# Patient Record
Sex: FEMALE | URBAN - METROPOLITAN AREA
[De-identification: names, ages, dates, MRNs, and addresses within clinical notes are randomized per-mention and may not be internally consistent; named-entity substitution may affect disease eponyms.]

---

## 2019-10-03 ENCOUNTER — APPOINTMENT (RX ONLY)
Dept: URBAN - METROPOLITAN AREA CLINIC 170 | Facility: CLINIC | Age: 84
Setting detail: DERMATOLOGY
End: 2019-10-03

## 2019-10-03 DIAGNOSIS — D18.0 HEMANGIOMA: ICD-10-CM

## 2019-10-03 DIAGNOSIS — L82.1 OTHER SEBORRHEIC KERATOSIS: ICD-10-CM

## 2019-10-03 DIAGNOSIS — D22 MELANOCYTIC NEVI: ICD-10-CM

## 2019-10-03 DIAGNOSIS — L81.4 OTHER MELANIN HYPERPIGMENTATION: ICD-10-CM

## 2019-10-03 DIAGNOSIS — L57.0 ACTINIC KERATOSIS: ICD-10-CM

## 2019-10-03 PROBLEM — I10 ESSENTIAL (PRIMARY) HYPERTENSION: Status: ACTIVE | Noted: 2019-10-03

## 2019-10-03 PROBLEM — D22.5 MELANOCYTIC NEVI OF TRUNK: Status: ACTIVE | Noted: 2019-10-03

## 2019-10-03 PROBLEM — D18.01 HEMANGIOMA OF SKIN AND SUBCUTANEOUS TISSUE: Status: ACTIVE | Noted: 2019-10-03

## 2019-10-03 PROCEDURE — 17003 DESTRUCT PREMALG LES 2-14: CPT

## 2019-10-03 PROCEDURE — ? FULL BODY SKIN EXAM

## 2019-10-03 PROCEDURE — ? COUNSELING

## 2019-10-03 PROCEDURE — ? LIQUID NITROGEN

## 2019-10-03 PROCEDURE — 17000 DESTRUCT PREMALG LESION: CPT

## 2019-10-03 PROCEDURE — 99213 OFFICE O/P EST LOW 20 MIN: CPT | Mod: 25

## 2019-10-03 ASSESSMENT — LOCATION DETAILED DESCRIPTION DERM
LOCATION DETAILED: NASAL DORSUM
LOCATION DETAILED: RIGHT LATERAL SUPERIOR CHEST
LOCATION DETAILED: MIDDLE STERNUM
LOCATION DETAILED: LEFT MEDIAL BREAST 10-11:00 REGION
LOCATION DETAILED: STERNAL NOTCH

## 2019-10-03 ASSESSMENT — LOCATION SIMPLE DESCRIPTION DERM
LOCATION SIMPLE: LEFT BREAST
LOCATION SIMPLE: NOSE
LOCATION SIMPLE: CHEST

## 2019-10-03 ASSESSMENT — LOCATION ZONE DERM
LOCATION ZONE: NOSE
LOCATION ZONE: TRUNK

## 2019-10-03 NOTE — PROCEDURE: MIPS QUALITY
Quality 474: Zoster Vaccination Status: Shingrix Vaccination Administered or Previously Received
Quality 431: Preventive Care And Screening: Unhealthy Alcohol Use - Screening: Patient screened for unhealthy alcohol use using a single question and scores less than 2 times per year
Quality 110: Preventive Care And Screening: Influenza Immunization: Influenza Immunization Administered during Influenza season
Quality 226: Preventive Care And Screening: Tobacco Use: Screening And Cessation Intervention: Patient screened for tobacco use and is an ex/non-smoker
Quality 111:Pneumonia Vaccination Status For Older Adults: Pneumococcal Vaccination Previously Received
Quality 130: Documentation Of Current Medications In The Medical Record: Current Medications Documented
Detail Level: Detailed

## 2019-10-03 NOTE — HPI: EVALUATION OF SKIN LESION(S)
What Type Of Note Output Would You Prefer (Optional)?: Bullet Format
Hpi Title: Evaluation of Skin Lesions
How Severe Are Your Spot(S)?: mild
Have Your Spot(S) Been Treated In The Past?: has not been treated
Family Member: Son
Additional History: Spot on nose pink and ooze and has bleed a little bit x 6 months. It is a little shiny. Used polysporin every night x 6 months.

## 2020-08-21 ENCOUNTER — APPOINTMENT (OUTPATIENT)
Dept: CT IMAGING | Age: 85
End: 2020-08-21
Payer: MEDICARE

## 2020-08-21 ENCOUNTER — APPOINTMENT (OUTPATIENT)
Dept: GENERAL RADIOLOGY | Age: 85
End: 2020-08-21
Payer: MEDICARE

## 2020-08-21 ENCOUNTER — HOSPITAL ENCOUNTER (EMERGENCY)
Age: 85
Discharge: HOME | End: 2020-08-22
Attending: EMERGENCY MEDICINE
Payer: MEDICARE

## 2020-08-21 VITALS
BODY MASS INDEX: 18.65 KG/M2 | HEART RATE: 85 BPM | DIASTOLIC BLOOD PRESSURE: 64 MMHG | SYSTOLIC BLOOD PRESSURE: 152 MMHG | HEIGHT: 60 IN | TEMPERATURE: 97.5 F | OXYGEN SATURATION: 98 % | RESPIRATION RATE: 14 BRPM | WEIGHT: 95 LBS

## 2020-08-21 PROCEDURE — 72072 X-RAY EXAM THORAC SPINE 3VWS: CPT

## 2020-08-21 PROCEDURE — 70450 CT HEAD/BRAIN W/O DYE: CPT

## 2020-08-21 PROCEDURE — 71046 X-RAY EXAM CHEST 2 VIEWS: CPT

## 2020-08-21 PROCEDURE — 99284 EMERGENCY DEPT VISIT MOD MDM: CPT

## 2020-08-21 RX ORDER — LIDOCAINE 4 G/G
1 PATCH TOPICAL ONCE
Status: DISCONTINUED | OUTPATIENT
Start: 2020-08-22 | End: 2020-08-22 | Stop reason: HOSPADM

## 2020-08-21 ASSESSMENT — ENCOUNTER SYMPTOMS
BACK PAIN: 1
ABDOMINAL PAIN: 0
NAUSEA: 0
VOMITING: 0
SHORTNESS OF BREATH: 0

## 2020-08-21 ASSESSMENT — PAIN DESCRIPTION - PAIN TYPE: TYPE: ACUTE PAIN

## 2020-08-21 ASSESSMENT — PAIN DESCRIPTION - LOCATION: LOCATION: HEAD

## 2020-08-21 ASSESSMENT — PAIN SCALES - GENERAL: PAINLEVEL_OUTOF10: 5

## 2020-08-22 PROCEDURE — 6370000000 HC RX 637 (ALT 250 FOR IP): Performed by: PHYSICIAN ASSISTANT

## 2020-08-22 NOTE — ED PROVIDER NOTES
ED Attending Attestation Note     Date of evaluation: 8/21/2020    This patient was seen by the advance practice provider. I have seen and examined the patient, agree with the workup, evaluation, management and diagnosis. The care plan has been discussed. My assessment reveals an awake and alert woman who appears in no discomfort or distress. Normal spontaneous full range of motion of the neck. Normal strength in her extremities. Abdomen soft without rebound guarding distention or focal tenderness. She used the bathroom prior to coming to the emergency department, and did not notice hematuria.           Elton Johnson MD  08/21/20 0188

## 2020-08-22 NOTE — ED NOTES
Bed: A03-03  Expected date:   Expected time:   Means of arrival:   Comments:  Hold for Gemma Purvis RN  08/21/20 2126

## 2020-08-22 NOTE — ED PROVIDER NOTES
[address] [facility] [address] ENCOUNTER          PHYSICIAN ASSISTANT NOTE       Date of evaluation: 8/21/2020    Chief Complaint     Fall (Patient presents after a fall 30 mins ago at home. Patient sts she missed the last step and hit her head. Denies dizziness or lightheadness. Denies taking blood thinning medication. )      History of Present Illness     Freddy Bobby is a 80 y.o. female with PMH of Arthritis, HLD who presents from home after mechanical fall. Patient states she was walking down the stairs and missed the last step. She fell backwards, striking her head on the wall, and her right side on the step. She denies LOC. She is not on blood thinners or ASA. She pushed her life alert button after walling and was assisted up by EMS. She was ambulatory after the fall without difficulty. She complains of \"goose egg\" to the back of her head and soreness to her right posterior ribs. She denies any HA, vision changes, dizziness, focal numbness or weakness, neck pain, chest pain, shortness of breath, abdominal pain, extremity pain. She states that she urinated post fall without any hematuria. Review of Systems     Review of Systems   Constitutional: Negative for chills and fever. Eyes: Negative for visual disturbance. Respiratory: Negative for shortness of breath. Cardiovascular: Negative for chest pain. Gastrointestinal: Negative for abdominal pain, nausea and vomiting. Genitourinary: Negative for flank pain and hematuria. Musculoskeletal: Positive for back pain. Negative for neck pain. Skin: Negative for wound. Neurological: Negative for dizziness, speech difficulty, weakness, numbness and headaches. Psychiatric/Behavioral: The patient is not nervous/anxious.         Past Medical, Surgical, Family, and Social History     She has a past medical history of Abdominal bruit, Carotid stenosis, Celiac disease, Degenerative arthritis of right shoulder region, Diverticulosis of colon, Hyperlipidemia, Iron defic anemia, Osteoarthritis of left hip, Osteoarthritis of left knee, Osteopenia, Osteoporosis, Pelvic pain, Sciatica of left side, Vitamin D deficiency, and Weight loss. She has a past surgical history that includes Tonsillectomy; Hysterectomy; bladder repair; Bartholin gland cyst excision; and Cataract removal with implant (10/25/12). Her family history includes Anxiety Disorder in her mother; Cancer in her father; Depression in her mother; Diabetes in her mother. She reports that she has never smoked. She has never used smokeless tobacco. She reports current alcohol use. Medications     Current Discharge Medication List      CONTINUE these medications which have NOT CHANGED    Details   amLODIPine Besylate (NORVASC PO) Take by mouth      acetaminophen (TYLENOL) 500 MG tablet Take 1,000 mg by mouth 3 times daily      LUTEIN PO Take 1 tablet by mouth daily. Calcium Carbonate (CALTRATE 600 PO) Take 2 tablets by mouth daily. vitamin B-12 (CYANOCOBALAMIN) 100 MCG tablet 1,000 mcg daily. Cholecalciferol (VITAMIN D) 1000 UNIT TABS Take 1,000 Units by mouth daily. docusate sodium (COLACE) 100 MG capsule Take 100 mg by mouth 3 times daily as needed. Associated Diagnoses: Constipation      ASCORBIC ACID daily. ferrous sulfate 325 (65 FE) MG tablet Take 325 mg by mouth daily. Allergies     She has No Known Allergies. Physical Exam     INITIAL VITALS: BP: (!) 177/73,Temp: 97.5 °F (36.4 °C), Pulse: 85, Resp: 14, SpO2: 98 %   Physical Exam  Vitals signs and nursing note reviewed. Constitutional:       General: She is not in acute distress. HENT:      Head: Normocephalic. Contusion (hematoma to occiptal scalp) present. Mouth/Throat:      Mouth: Mucous membranes are moist.      Pharynx: Oropharynx is clear. Eyes:      Extraocular Movements: Extraocular movements intact.       Conjunctiva/sclera: Conjunctivae normal.      Pupils: Pupils are Emergency [address] [facility]  [phone]    If symptoms worsen      DISCHARGE MEDICATIONS:  Current Discharge Medication List           DISPOSITION  Discharge.        Leandra Meza PA-C  08/21/20 [address] [facility] [place]  08/21/20 8706

## 2020-10-08 ENCOUNTER — APPOINTMENT (RX ONLY)
Dept: URBAN - METROPOLITAN AREA CLINIC 170 | Facility: CLINIC | Age: 85
Setting detail: DERMATOLOGY
End: 2020-10-08

## 2020-10-08 DIAGNOSIS — D18.0 HEMANGIOMA: ICD-10-CM

## 2020-10-08 DIAGNOSIS — L82.1 OTHER SEBORRHEIC KERATOSIS: ICD-10-CM

## 2020-10-08 DIAGNOSIS — L81.4 OTHER MELANIN HYPERPIGMENTATION: ICD-10-CM

## 2020-10-08 DIAGNOSIS — D22 MELANOCYTIC NEVI: ICD-10-CM

## 2020-10-08 PROBLEM — D18.01 HEMANGIOMA OF SKIN AND SUBCUTANEOUS TISSUE: Status: ACTIVE | Noted: 2020-10-08

## 2020-10-08 PROBLEM — D22.5 MELANOCYTIC NEVI OF TRUNK: Status: ACTIVE | Noted: 2020-10-08

## 2020-10-08 PROCEDURE — ? ADDITIONAL NOTES

## 2020-10-08 PROCEDURE — ? FULL BODY SKIN EXAM

## 2020-10-08 PROCEDURE — ? COUNSELING

## 2020-10-08 PROCEDURE — 99213 OFFICE O/P EST LOW 20 MIN: CPT

## 2020-10-08 ASSESSMENT — LOCATION SIMPLE DESCRIPTION DERM
LOCATION SIMPLE: LEFT BREAST
LOCATION SIMPLE: CHEST

## 2020-10-08 ASSESSMENT — LOCATION DETAILED DESCRIPTION DERM
LOCATION DETAILED: RIGHT LATERAL SUPERIOR CHEST
LOCATION DETAILED: MIDDLE STERNUM
LOCATION DETAILED: LEFT MEDIAL BREAST 10-11:00 REGION
LOCATION DETAILED: STERNAL NOTCH

## 2020-10-08 ASSESSMENT — LOCATION ZONE DERM: LOCATION ZONE: TRUNK

## 2020-10-08 NOTE — HPI: EVALUATION OF SKIN LESION(S)
What Type Of Note Output Would You Prefer (Optional)?: Bullet Format
Hpi Title: Evaluation of Skin Lesions
How Severe Are Your Spot(S)?: mild
Have Your Spot(S) Been Treated In The Past?: has been treated
Family Member: Son

## 2020-11-10 ENCOUNTER — HOSPITAL ENCOUNTER (EMERGENCY)
Age: 85
Discharge: HOME | End: 2020-11-10
Attending: EMERGENCY MEDICINE
Payer: MEDICARE

## 2020-11-10 VITALS
SYSTOLIC BLOOD PRESSURE: 148 MMHG | WEIGHT: 95 LBS | OXYGEN SATURATION: 96 % | RESPIRATION RATE: 16 BRPM | DIASTOLIC BLOOD PRESSURE: 66 MMHG | HEART RATE: 80 BPM | HEIGHT: 60 IN | TEMPERATURE: 98.4 F | BODY MASS INDEX: 18.65 KG/M2

## 2020-11-10 PROCEDURE — 96375 TX/PRO/DX INJ NEW DRUG ADDON: CPT

## 2020-11-10 PROCEDURE — 6370000000 HC RX 637 (ALT 250 FOR IP): Performed by: PHYSICIAN ASSISTANT

## 2020-11-10 PROCEDURE — 96374 THER/PROPH/DIAG INJ IV PUSH: CPT

## 2020-11-10 PROCEDURE — 6360000002 HC RX W HCPCS: Performed by: PHYSICIAN ASSISTANT

## 2020-11-10 PROCEDURE — 2500000003 HC RX 250 WO HCPCS: Performed by: PHYSICIAN ASSISTANT

## 2020-11-10 PROCEDURE — 99283 EMERGENCY DEPT VISIT LOW MDM: CPT

## 2020-11-10 RX ORDER — PREDNISONE 20 MG/1
60 TABLET ORAL ONCE
Status: COMPLETED | OUTPATIENT
Start: 2020-11-10 | End: 2020-11-10

## 2020-11-10 RX ORDER — DIPHENHYDRAMINE HYDROCHLORIDE 50 MG/ML
25 INJECTION INTRAMUSCULAR; INTRAVENOUS ONCE
Status: COMPLETED | OUTPATIENT
Start: 2020-11-10 | End: 2020-11-10

## 2020-11-10 RX ADMIN — PREDNISONE 60 MG: 20 TABLET ORAL at 10:09

## 2020-11-10 RX ADMIN — DIPHENHYDRAMINE HYDROCHLORIDE 25 MG: 50 INJECTION, SOLUTION INTRAMUSCULAR; INTRAVENOUS at 10:10

## 2020-11-10 RX ADMIN — FAMOTIDINE 20 MG: 10 INJECTION, SOLUTION INTRAVENOUS at 10:10

## 2020-11-10 ASSESSMENT — ENCOUNTER SYMPTOMS
NAUSEA: 0
DIARRHEA: 1
VOMITING: 0
FACIAL SWELLING: 1
COUGH: 0
TROUBLE SWALLOWING: 0
EYE REDNESS: 0
CHEST TIGHTNESS: 0
ABDOMINAL PAIN: 0
WHEEZING: 0
VOICE CHANGE: 0
SHORTNESS OF BREATH: 0

## 2020-11-10 NOTE — ED PROVIDER NOTES
[address] [facility]  EMERGENCY DEPARTMENT ENCOUNTER          PHYSICIAN ASSISTANT NOTE       Date of evaluation: 11/10/2020    Chief Complaint     Allergic Reaction      History of Present Illness     Tyesha Day is a 80 y.o. female with PMH of Celiac dz, HLD, Iron deficiency Anemia who presents with tongue swelling. Patient states that she did not sleep well overnight. She woke up around 2 AM with a metallic taste in her mouth and when she awoke around 8AM, her tongue felt \"thick. \" She states that this has since improved but she reports it is not back to normal. She was able to take 2 tylenol this morning for her arthritis without difficulty. She has not otherwise ate or drank anything. She reports no recent change in medications, other than introduction of Prolia a couple weeks ago. She states that she had PO contrast for a CT yesterday and believes this may be the trigger of an allergic reaction. She believes she has had PO contrast previously. She denies any associated difficulty swallowing, change in voice, difficulty breathing, rash or itching, nausea or vomiting. Her lower lip appears swollen to her daughter in law at bedside but patient denies noticing any change. Review of Systems     Review of Systems   Constitutional: Negative for chills and fever. HENT: Positive for facial swelling. Negative for drooling, trouble swallowing and voice change. Eyes: Negative for redness. Respiratory: Negative for cough, chest tightness, shortness of breath and wheezing. Cardiovascular: Negative for chest pain and leg swelling. Gastrointestinal: Positive for diarrhea (chronic). Negative for abdominal pain, nausea and vomiting. Genitourinary: Negative for difficulty urinating. Musculoskeletal: Negative for gait problem. Skin: Negative for wound. Neurological: Negative for dizziness and headaches. Psychiatric/Behavioral: The patient is not nervous/anxious.         Past Medical, Surgical, Family, and Social History     She has a past medical history of Abdominal bruit, Carotid stenosis, Celiac disease, Degenerative arthritis of right shoulder region, Diverticulosis of colon, Hyperlipidemia, Iron defic anemia, Osteoarthritis of left hip, Osteoarthritis of left knee, Osteopenia, Osteoporosis, Pelvic pain, Sciatica of left side, Vitamin D deficiency, and Weight loss. She has a past surgical history that includes Tonsillectomy; Hysterectomy; bladder repair; Bartholin gland cyst excision; and Cataract removal with implant (10/25/12). Her family history includes Anxiety Disorder in her mother; Cancer in her father; Depression in her mother; Diabetes in her mother. She reports that she has never smoked. She has never used smokeless tobacco. She reports current alcohol use. Medications     Previous Medications    ACETAMINOPHEN (TYLENOL) 500 MG TABLET    Take 1,000 mg by mouth 3 times daily    AMLODIPINE BESYLATE (NORVASC PO)    Take by mouth    ASCORBIC ACID    daily. CALCIUM CARBONATE (CALTRATE 600 PO)    Take 2 tablets by mouth daily. CHOLECALCIFEROL (VITAMIN D) 1000 UNIT TABS    Take 1,000 Units by mouth daily. DOCUSATE SODIUM (COLACE) 100 MG CAPSULE    Take 100 mg by mouth 3 times daily as needed. FERROUS SULFATE 325 (65 FE) MG TABLET    Take 325 mg by mouth daily. LUTEIN PO    Take 1 tablet by mouth daily. VITAMIN B-12 (CYANOCOBALAMIN) 100 MCG TABLET    1,000 mcg daily. Allergies     She is allergic to brimonidine; dorzolamide; and netarsudil. Physical Exam     INITIAL VITALS: BP: (!) 153/74,Temp: 98.4 °F (36.9 °C), Pulse: 80, Resp: 16, SpO2: 99 %   Physical Exam  Vitals signs and nursing note reviewed. Constitutional:       General: She is not in acute distress. HENT:      Head: Normocephalic and atraumatic. Mouth/Throat:      Mouth: Mucous membranes are moist.      Pharynx: Oropharynx is clear. Uvula midline. No uvula swelling.       Comments: Patient's tongue and lower lip appears slightly enlarged but symmetric. No swelling of oropharynx. Uvula midline without swelling. Patient tolerating secretions without difficulty. No drooling. No stridor. Eyes:      Extraocular Movements: Extraocular movements intact. Conjunctiva/sclera: Conjunctivae normal.   Neck:      Musculoskeletal: Neck supple. Cardiovascular:      Rate and Rhythm: Normal rate and regular rhythm. Pulmonary:      Effort: Pulmonary effort is normal. No respiratory distress. Breath sounds: No wheezing, rhonchi or rales. Abdominal:      General: Bowel sounds are normal. There is no distension. Palpations: Abdomen is soft. Tenderness: There is no abdominal tenderness. There is no guarding or rebound. Musculoskeletal:      Right lower leg: No edema. Left lower leg: No edema. Skin:     General: Skin is warm and dry. Neurological:      Mental Status: She is alert and oriented to person, place, and time. Psychiatric:         Mood and Affect: Mood normal.         Behavior: Behavior normal.         Diagnostic Results     RADIOLOGY:  No orders to display       LABS:   No results found for this visit on 11/10/20. RECENT VITALS:  BP: (!) 148/66, Temp: 98.4 °F (36.9 °C), Pulse: 80,Resp: 16, SpO2: 96 %     Procedures         ED Course     Nursing Notes, Past Medical Hx, Past Surgical Hx, Social Hx, Allergies, and Family Hx were reviewed. The patient was given the followingmedications:  Orders Placed This Encounter   Medications    diphenhydrAMINE (BENADRYL) injection 25 mg    famotidine (PEPCID) injection 20 mg    predniSONE (DELTASONE) tablet 60 mg       CONSULTS:  None    MEDICAL DECISION MAKING / ASSESSMENT / Isidro Yenni is a 80 y.o. female with PMH of Celiac dz, HLD, Iron deficiency Anemia who presents with tongue swelling since waking up this morning around 8AM. Patient also reports difficulty sleeping and metallic taste in her mouth overnight.  She reports taking tylenol this morning without difficulty. She states that she had PO contrast for a CT scan yesterday. She was started on Prolia injection a couple weeks ago. No other change in medications or oral intake. No associated difficulty swallowing or breathing, change in voice, rash or hives. Physical exam reveals well appearing 80year old female in no acute respiratory distress. Voice normal. She is tolerating secretions without difficulty. No drooling. No stridor. Tongue and lower lip appear slightly swollen but symmetric. Lungs clear. Abdomen soft and non-tender. No rash noted. This likely represents allergic rxn to ? PO contrast.  IV access was established. Patient given IV Benadryl, IV pepcid and PO prednisone. Will plan to observe for several hours. Patient was observed for a total of 4 hours without significant worsening. She was instructed on continuing benadryl at home. Instructed to follow up with PCP. She is stable for discharge home at this time with strict return precautions. This patient was also evaluated by the attending physician. All care plans were discussed and agreed upon. Clinical Impression     1. Allergic reaction, initial encounter    2. Tongue swelling        Disposition     PATIENT REFERRED TO:  [facility] Emergency Department  [address] [place] [address]  [phone]    If symptoms worsen      DISCHARGE MEDICATIONS:  New Prescriptions    No medications on file        DISPOSITION  Discharge.        Shahzad Becerril PA-C  11/10/20 1212

## 2020-11-10 NOTE — ED PROVIDER NOTES
ED Attending Attestation Note     Date of evaluation: 11/10/2020    This patient was seen by the advance practice provider. I have seen and examined the patient, agree with the workup, evaluation, management and diagnosis. The care plan has been discussed. I have reviewed the ECG and concur with the PIEDAD's interpretation. My assessment reveals a 80year old female with a past medical history of arthritis, sciatica, HLD, and osteoporosis who presents with a complaint of an allergic reaction. She drank oral contrast yesterday for a CT scan of her abdomen and awoke with a metallic taste in her mouth. On my assessment she has a normal appearing oropharynx. Uvula midline and not swollen. Tongue and lips do not appear significantly swollen. She is speaking in full sentences with no voice changes. She has clear lungs with a regular rate and rhythm.             Anjelica Mcgraw MD  11/10/20 5557

## 2020-11-10 NOTE — ED NOTES
Pt DC from ED ambulatory. She verbalized understanding to DC instructions. Family called for . VSS.       Morteza Evangelista RN  11/10/20 1037

## 2020-11-12 ENCOUNTER — HOSPITAL ENCOUNTER (EMERGENCY)
Age: 85
Discharge: HOME | End: 2020-11-12
Attending: EMERGENCY MEDICINE
Payer: MEDICARE

## 2020-11-12 VITALS
HEART RATE: 86 BPM | OXYGEN SATURATION: 97 % | DIASTOLIC BLOOD PRESSURE: 79 MMHG | RESPIRATION RATE: 18 BRPM | TEMPERATURE: 97.9 F | SYSTOLIC BLOOD PRESSURE: 144 MMHG

## 2020-11-12 LAB
ANION GAP SERPL CALCULATED.3IONS-SCNC: 7 MMOL/L (ref 3–16)
BUN BLDV-MCNC: 26 MG/DL (ref 7–20)
CALCIUM SERPL-MCNC: 8.7 MG/DL (ref 8.3–10.6)
CHLORIDE BLD-SCNC: 102 MMOL/L (ref 99–110)
CO2: 26 MMOL/L (ref 21–32)
CREAT SERPL-MCNC: 0.7 MG/DL (ref 0.6–1.2)
GFR AFRICAN AMERICAN: >60
GFR NON-AFRICAN AMERICAN: >60
GLUCOSE BLD-MCNC: 100 MG/DL (ref 70–99)
POTASSIUM REFLEX MAGNESIUM: 4.4 MMOL/L (ref 3.5–5.1)
SODIUM BLD-SCNC: 135 MMOL/L (ref 136–145)

## 2020-11-12 PROCEDURE — 99283 EMERGENCY DEPT VISIT LOW MDM: CPT

## 2020-11-12 PROCEDURE — 80048 BASIC METABOLIC PNL TOTAL CA: CPT

## 2020-11-12 ASSESSMENT — ENCOUNTER SYMPTOMS
EYE ITCHING: 0
ABDOMINAL PAIN: 0
NAUSEA: 0
WHEEZING: 0
TROUBLE SWALLOWING: 0
FACIAL SWELLING: 0
SHORTNESS OF BREATH: 0
COLOR CHANGE: 1
VOMITING: 0

## 2020-11-12 NOTE — ED PROVIDER NOTES
ED Attending Attestation Note     Date of evaluation: 11/12/2020    This patient was seen by the advance practice provider. I have seen and examined the patient, agree with the workup, evaluation, management and diagnosis. The care plan has been discussed. My assessment reveals some redness of the patient's palms and some slight swelling of the right hand, no desquamation, no blistering.      Shade Melo MD  11/12/20 1154

## 2020-11-12 NOTE — ED TRIAGE NOTES
Pt states she is reacting to the ct contrast that she received on Monday. Pt states on Tuesday she was treated from oral swelling and hand swelling because of the contrast. Pt denies difficulty breathing, chest pain, or dizziness.

## 2020-11-12 NOTE — ED NOTES
Pt discharged to home. Pt verbalized understanding of discharged instructions. Pt left ED ambulatory without incident.         Maryam Willis RN  11/12/20 3101

## 2020-11-12 NOTE — ED PROVIDER NOTES
[address] [facility] [address] ENCOUNTER          PHYSICIAN ASSISTANT NOTE       Date of evaluation: 11/12/2020    Chief Complaint     Allergic Reaction (from ct contrast from Monday)      History of Present Illness     Tirso Bermeo is a 80 y.o. female with a history of iron deficiency anemia, hyperlipidemia who was seen here in the emergency department 2 days ago with concern for possible allergic reaction following a CT with oral contrast that she underwent 3 days ago. The patient reported metallic taste in her mouth, tongue swelling and lower lip swelling that was present at that time. She had no difficulty swallowing or breathing at that time. She also had no skin changes at that time including rashes or hives. She was given Benadryl, Pepcid and p.o. prednisone in the emergency department. She was observed for 4 hours and was discharged home with instructions to continue Benadryl as needed. She returns to the emergency department today reporting that she has taken Benadryl twice at home most recently last night before bed. She reports that after she went to bed last night approximately 2 hours later she was awoken with bilateral hand pain and redness. She reports that her hands felt swollen and heavy with a burning sensation on her palms. She also endorses a feeling that her gums on the left side of her mouth are getting in the way of her teeth. She denies any other symptoms since being discharged from the hospital including difficulty swallowing, shortness of breath, dizziness, nausea, vomiting, abdominal pain, change in bowel or bladder function. Review of Systems     Review of Systems   Constitutional: Negative for fever. HENT: Negative for facial swelling and trouble swallowing. Eyes: Negative for itching. Respiratory: Negative for shortness of breath and wheezing. Cardiovascular: Negative for chest pain.    Gastrointestinal: Negative for abdominal pain, nausea and vomiting. Genitourinary: Negative for dysuria. Skin: Positive for color change. Negative for rash and wound. Neurological: Negative for dizziness. Past Medical, Surgical, Family, and Social History     She has a past medical history of Abdominal bruit, Carotid stenosis, Celiac disease, Degenerative arthritis of right shoulder region, Diverticulosis of colon, Hyperlipidemia, Iron defic anemia, Osteoarthritis of left hip, Osteoarthritis of left knee, Osteopenia, Osteoporosis, Pelvic pain, Sciatica of left side, Vitamin D deficiency, and Weight loss. She has a past surgical history that includes Tonsillectomy; Hysterectomy; bladder repair; Bartholin gland cyst excision; and Cataract removal with implant (10/25/12). Her family history includes Anxiety Disorder in her mother; Cancer in her father; Depression in her mother; Diabetes in her mother. She reports that she has never smoked. She has never used smokeless tobacco. She reports current alcohol use. Medications     Previous Medications    ACETAMINOPHEN (TYLENOL) 500 MG TABLET    Take 1,000 mg by mouth 3 times daily    AMLODIPINE BESYLATE (NORVASC PO)    Take by mouth    ASCORBIC ACID    daily. CALCIUM CARBONATE (CALTRATE 600 PO)    Take 2 tablets by mouth daily. CHOLECALCIFEROL (VITAMIN D) 1000 UNIT TABS    Take 1,000 Units by mouth daily. DOCUSATE SODIUM (COLACE) 100 MG CAPSULE    Take 100 mg by mouth 3 times daily as needed. FERROUS SULFATE 325 (65 FE) MG TABLET    Take 325 mg by mouth daily. LUTEIN PO    Take 1 tablet by mouth daily. VITAMIN B-12 (CYANOCOBALAMIN) 100 MCG TABLET    1,000 mcg daily. Allergies     She is allergic to brimonidine; dorzolamide; and netarsudil. Physical Exam     INITIAL VITALS: BP: (!) 144/79, Temp: 97.9 °F (36.6 °C), Pulse: 86, Resp: 18, SpO2: 97 %  Physical Exam  Constitutional:       Appearance: Normal appearance. HENT:      Head: Normocephalic and atraumatic.       Comments: Posterior oropharynx is without erythema or swelling. Patient is moving secretions without difficulty. Uvula is midline. There is no angioedema present. Mouth/Throat:      Comments: Posterior oropharynx is without erythema or swelling. Patient is moving secretions without difficulty. Uvula is midline. There is no angioedema present. Neck:      Musculoskeletal: Normal range of motion and neck supple. Cardiovascular:      Rate and Rhythm: Normal rate and regular rhythm. Pulses: Normal pulses. Heart sounds: Normal heart sounds. No murmur. No friction rub. No gallop. Pulmonary:      Effort: Pulmonary effort is normal. No respiratory distress. Breath sounds: Normal breath sounds. No wheezing. Abdominal:      General: Abdomen is flat. Bowel sounds are normal.      Palpations: Abdomen is soft. Tenderness: There is no abdominal tenderness. Musculoskeletal: Normal range of motion. Skin:     General: Skin is warm and dry. Comments: Patient does have slight erythema of her palms bilaterally without blistering or sloughing. There is no blistering to the oral mucosa or other areas of the body. Neurological:      General: No focal deficit present. Mental Status: She is oriented to person, place, and time.    Psychiatric:         Mood and Affect: Mood normal.         Behavior: Behavior normal.         Diagnostic Results         RADIOLOGY:  No orders to display       LABS:   Results for orders placed or performed during the hospital encounter of 24/29/97   Basic Metabolic Panel w/ Reflex to MG   Result Value Ref Range    Sodium 135 (L) 136 - 145 mmol/L    Potassium reflex Magnesium 4.4 3.5 - 5.1 mmol/L    Chloride 102 99 - 110 mmol/L    CO2 26 21 - 32 mmol/L    Anion Gap 7 3 - 16    Glucose 100 (H) 70 - 99 mg/dL    BUN 26 (H) 7 - 20 mg/dL    CREATININE 0.7 0.6 - 1.2 mg/dL    GFR Non-African American >60 >60    GFR African American >60 >60    Calcium 8.7 8.3 - 10.6 mg/dL       ED BEDSIDE over her palms bilaterally without sloughing of the skin or blistering. Patient has full sensation in her upper extremities bilaterally. There is no erythema, blistering involving the oral mucosa. Posterior oropharynx is moist and the patient is moving secretions without difficulty. There is no swelling to the posterior oropharynx. Uvula is midline. There is no angioedema present. BMP shows BUN of 26 and is otherwise without significant abnormality. The patient's presentation is suggestive of continued mild allergic reaction secondary to contrast dye. She will be instructed to take a nonsedating antihistamine such as Zyrtec for symptoms. As her kidney function is normal and she is without nausea, vomiting, abdominal pain, diarrhea, respiratory symptoms, angioedema she will be discharged with these instructions as well as with strict return precautions. This patient was also evaluated by the attending physician. All care plans were discussed and agreed upon. Clinical Impression     1. Allergic reaction, subsequent encounter        Disposition     PATIENT REFERRED TO:  No follow-up provider specified.     DISCHARGE MEDICATIONS:  New Prescriptions    No medications on file       DISPOSITION Decision To Discharge 11/12/2020 12:07:53 PM        Irina Wilson PA-C  11/12/20 [id number]

## 2021-07-20 ENCOUNTER — APPOINTMENT (RX ONLY)
Dept: URBAN - METROPOLITAN AREA CLINIC 170 | Facility: CLINIC | Age: 86
Setting detail: DERMATOLOGY
End: 2021-07-20

## 2021-07-20 DIAGNOSIS — L57.0 ACTINIC KERATOSIS: ICD-10-CM

## 2021-07-20 PROCEDURE — 17003 DESTRUCT PREMALG LES 2-14: CPT

## 2021-07-20 PROCEDURE — ? LIQUID NITROGEN

## 2021-07-20 PROCEDURE — ? COUNSELING

## 2021-07-20 PROCEDURE — 17000 DESTRUCT PREMALG LESION: CPT

## 2021-07-20 ASSESSMENT — LOCATION SIMPLE DESCRIPTION DERM: LOCATION SIMPLE: LEFT CHEEK

## 2021-07-20 ASSESSMENT — LOCATION DETAILED DESCRIPTION DERM
LOCATION DETAILED: LEFT INFERIOR MEDIAL MALAR CHEEK
LOCATION DETAILED: LEFT CENTRAL MALAR CHEEK

## 2021-07-20 ASSESSMENT — LOCATION ZONE DERM: LOCATION ZONE: FACE

## 2021-07-20 NOTE — PROCEDURE: LIQUID NITROGEN
Consent: The patient's consent was obtained including but not limited to risks of crusting, scabbing, blistering, scarring, darker or lighter pigmentary change, recurrence, incomplete removal and infection.
Show Aperture Variable?: Yes
Number Of Freeze-Thaw Cycles: 1 freeze-thaw cycle
Detail Level: Detailed
Post-Care Instructions: I reviewed with the patient in detail post-care instructions. Patient is to wear sunprotection, and avoid picking at any of the treated lesions. Pt may apply Vaseline to crusted or scabbing areas.
Duration Of Freeze Thaw-Cycle (Seconds): 5
Render Note In Bullet Format When Appropriate: No

## 2021-07-20 NOTE — HPI: SKIN LESION
How Severe Is Your Skin Lesion?: mild
Has Your Skin Lesion Been Treated?: not been treated
Is This A New Presentation, Or A Follow-Up?: Skin Lesions
Which Family Member (Optional)?: Son

## 2021-10-21 ENCOUNTER — APPOINTMENT (RX ONLY)
Dept: URBAN - METROPOLITAN AREA CLINIC 170 | Facility: CLINIC | Age: 86
Setting detail: DERMATOLOGY
End: 2021-10-21

## 2021-10-21 DIAGNOSIS — D22 MELANOCYTIC NEVI: ICD-10-CM | Status: STABLE

## 2021-10-21 DIAGNOSIS — D18.0 HEMANGIOMA: ICD-10-CM | Status: STABLE

## 2021-10-21 DIAGNOSIS — L81.4 OTHER MELANIN HYPERPIGMENTATION: ICD-10-CM | Status: STABLE

## 2021-10-21 DIAGNOSIS — L82.1 OTHER SEBORRHEIC KERATOSIS: ICD-10-CM | Status: STABLE

## 2021-10-21 PROBLEM — D18.01 HEMANGIOMA OF SKIN AND SUBCUTANEOUS TISSUE: Status: ACTIVE | Noted: 2021-10-21

## 2021-10-21 PROBLEM — D22.5 MELANOCYTIC NEVI OF TRUNK: Status: ACTIVE | Noted: 2021-10-21

## 2021-10-21 PROCEDURE — ? TREATMENT REGIMEN

## 2021-10-21 PROCEDURE — 99213 OFFICE O/P EST LOW 20 MIN: CPT

## 2021-10-21 PROCEDURE — ? FULL BODY SKIN EXAM

## 2021-10-21 PROCEDURE — ? COUNSELING

## 2021-10-21 PROCEDURE — ? ADDITIONAL NOTES

## 2021-10-21 ASSESSMENT — LOCATION SIMPLE DESCRIPTION DERM
LOCATION SIMPLE: RIGHT UPPER BACK
LOCATION SIMPLE: LEFT LOWER BACK
LOCATION SIMPLE: ABDOMEN

## 2021-10-21 ASSESSMENT — LOCATION ZONE DERM: LOCATION ZONE: TRUNK

## 2021-10-21 ASSESSMENT — LOCATION DETAILED DESCRIPTION DERM
LOCATION DETAILED: EPIGASTRIC SKIN
LOCATION DETAILED: RIGHT MID-UPPER BACK
LOCATION DETAILED: LEFT SUPERIOR MEDIAL MIDBACK
LOCATION DETAILED: PERIUMBILICAL SKIN

## 2021-10-21 NOTE — PROCEDURE: MIPS QUALITY
Quality 431: Preventive Care And Screening: Unhealthy Alcohol Use - Screening: Documentation of medical reason(s) for not screening for unhealthy alcohol use (e.g., limited life expectancy, other medical reasons)

## 2021-10-21 NOTE — PROCEDURE: MIPS QUALITY
Quality 130: Documentation Of Current Medications In The Medical Record: Current Medications with Name, Dosage, Frequency, or Route not Documented, Reason not Given

## 2023-01-12 ENCOUNTER — APPOINTMENT (RX ONLY)
Dept: URBAN - METROPOLITAN AREA CLINIC 170 | Facility: CLINIC | Age: 88
Setting detail: DERMATOLOGY
End: 2023-01-12

## 2023-01-12 DIAGNOSIS — D22 MELANOCYTIC NEVI: ICD-10-CM

## 2023-01-12 DIAGNOSIS — L82.1 OTHER SEBORRHEIC KERATOSIS: ICD-10-CM

## 2023-01-12 DIAGNOSIS — D18.0 HEMANGIOMA: ICD-10-CM

## 2023-01-12 DIAGNOSIS — L57.0 ACTINIC KERATOSIS: ICD-10-CM

## 2023-01-12 DIAGNOSIS — L81.4 OTHER MELANIN HYPERPIGMENTATION: ICD-10-CM

## 2023-01-12 PROBLEM — D18.01 HEMANGIOMA OF SKIN AND SUBCUTANEOUS TISSUE: Status: ACTIVE | Noted: 2023-01-12

## 2023-01-12 PROBLEM — D22.5 MELANOCYTIC NEVI OF TRUNK: Status: ACTIVE | Noted: 2023-01-12

## 2023-01-12 PROCEDURE — ? COUNSELING

## 2023-01-12 PROCEDURE — 17003 DESTRUCT PREMALG LES 2-14: CPT

## 2023-01-12 PROCEDURE — ? LIQUID NITROGEN

## 2023-01-12 PROCEDURE — 17000 DESTRUCT PREMALG LESION: CPT

## 2023-01-12 PROCEDURE — ? ADDITIONAL NOTES

## 2023-01-12 PROCEDURE — 99213 OFFICE O/P EST LOW 20 MIN: CPT | Mod: 25

## 2023-01-12 PROCEDURE — ? TREATMENT REGIMEN

## 2023-01-12 ASSESSMENT — LOCATION SIMPLE DESCRIPTION DERM
LOCATION SIMPLE: NOSE
LOCATION SIMPLE: CHEST
LOCATION SIMPLE: LEFT CHEEK
LOCATION SIMPLE: LEFT BREAST

## 2023-01-12 ASSESSMENT — LOCATION DETAILED DESCRIPTION DERM
LOCATION DETAILED: STERNAL NOTCH
LOCATION DETAILED: NASAL SUPRATIP
LOCATION DETAILED: LEFT INFERIOR MEDIAL MALAR CHEEK
LOCATION DETAILED: MIDDLE STERNUM
LOCATION DETAILED: LEFT MEDIAL BREAST 10-11:00 REGION
LOCATION DETAILED: RIGHT LATERAL SUPERIOR CHEST

## 2023-01-12 ASSESSMENT — LOCATION ZONE DERM
LOCATION ZONE: NOSE
LOCATION ZONE: TRUNK
LOCATION ZONE: FACE

## 2023-01-12 NOTE — HPI: EVALUATION OF SKIN LESION(S)
What Type Of Note Output Would You Prefer (Optional)?: Bullet Format
Hpi Title: Evaluation of Skin Lesions
How Severe Are Your Spot(S)?: mild
Have Your Spot(S) Been Treated In The Past?: has been treated
Family Member: Mother and son
Additional History: Seen with Daughter Brittani today. Patient states that the only thing she noticed is a spot on her nose and left cheek

## 2023-01-12 NOTE — PROCEDURE: MIPS QUALITY
Quality 431: Preventive Care And Screening: Unhealthy Alcohol Use - Screening: Patient not identified as an unhealthy alcohol user when screened for unhealthy alcohol use using a systematic screening method
Name And Contact Information For Health Care Proxy: Brittani House
Quality 47: Advance Care Plan: Advance Care Planning discussed and documented; advance care plan or surrogate decision maker documented in the medical record.
Detail Level: Detailed
Quality 226: Preventive Care And Screening: Tobacco Use: Screening And Cessation Intervention: Patient screened for tobacco use and is an ex/non-smoker
Quality 130: Documentation Of Current Medications In The Medical Record: Current Medications Documented
Quality 111:Pneumonia Vaccination Status For Older Adults: Pneumococcal vaccine (PPSV23) administered on or after patient’s 60th birthday and before the end of the measurement period

## 2024-06-17 ENCOUNTER — APPOINTMENT (RX ONLY)
Dept: URBAN - METROPOLITAN AREA CLINIC 170 | Facility: CLINIC | Age: 89
Setting detail: DERMATOLOGY
End: 2024-06-17

## 2024-06-17 DIAGNOSIS — L57.0 ACTINIC KERATOSIS: ICD-10-CM | Status: INADEQUATELY CONTROLLED

## 2024-06-17 PROCEDURE — ? LIQUID NITROGEN

## 2024-06-17 PROCEDURE — ? FULL BODY SKIN EXAM - DECLINED

## 2024-06-17 PROCEDURE — 17000 DESTRUCT PREMALG LESION: CPT

## 2024-06-17 PROCEDURE — 17003 DESTRUCT PREMALG LES 2-14: CPT

## 2024-06-17 ASSESSMENT — LOCATION DETAILED DESCRIPTION DERM
LOCATION DETAILED: NASAL DORSUM
LOCATION DETAILED: LEFT MEDIAL MALAR CHEEK

## 2024-06-17 ASSESSMENT — LOCATION ZONE DERM
LOCATION ZONE: NOSE
LOCATION ZONE: FACE

## 2024-06-17 ASSESSMENT — LOCATION SIMPLE DESCRIPTION DERM
LOCATION SIMPLE: LEFT CHEEK
LOCATION SIMPLE: NOSE

## 2024-06-17 NOTE — HPI: SKIN LESION
What Type Of Note Output Would You Prefer (Optional)?: Bullet Format
How Severe Is Your Skin Lesion?: mild
[id number]: treated_been_treated
Is This A New Presentation, Or A Follow-Up?: Skin Lesion
Which Family Member (Optional)?: Mom, son
Additional History: Possibly ln2 last visit, patient stated went away for awhile but reoccurrence.

## 2024-06-17 NOTE — PROCEDURE: LIQUID NITROGEN
Post-Care Instructions: I reviewed with the patient in detail post-care instructions. Patient is to wear sunprotection, and avoid picking at any of the treated lesions. Pt may apply Vaseline to crusted or scabbing areas.
Render Post-Care Instructions In Note?: yes
Consent: The patient's consent was obtained including but not limited to risks of crusting, scabbing, blistering, scarring, darker or lighter pigmentary change, recurrence, incomplete removal and infection.
Duration Of Freeze Thaw-Cycle (Seconds): 0
Detail Level: Detailed
Number Of Freeze-Thaw Cycles: 2 freeze-thaw cycles
Render Note In Bullet Format When Appropriate: No

## 2024-07-05 NOTE — HPI: EVALUATION OF SKIN LESION(S)
What Type Of Note Output Would You Prefer (Optional)?: Bullet Format
Hpi Title: Evaluation of Skin Lesions
How Severe Are Your Spot(S)?: mild
Have Your Spot(S) Been Treated In The Past?: has not been treated
Family Member: Son
Shala Wilson RN CM
96

## 2024-09-16 ENCOUNTER — APPOINTMENT (OUTPATIENT)
Dept: CT IMAGING | Age: 89
DRG: 312 | End: 2024-09-16
Payer: MEDICARE

## 2024-09-16 ENCOUNTER — HOSPITAL ENCOUNTER (INPATIENT)
Age: 89
LOS: 1 days | Discharge: HOME | DRG: 312 | End: 2024-09-16
Attending: EMERGENCY MEDICINE | Admitting: INTERNAL MEDICINE
Payer: MEDICARE

## 2024-09-16 ENCOUNTER — APPOINTMENT (OUTPATIENT)
Dept: GENERAL RADIOLOGY | Age: 89
DRG: 312 | End: 2024-09-16
Payer: MEDICARE

## 2024-09-16 VITALS
HEIGHT: 60 IN | OXYGEN SATURATION: 96 % | TEMPERATURE: 98.2 F | WEIGHT: 89.3 LBS | SYSTOLIC BLOOD PRESSURE: 145 MMHG | BODY MASS INDEX: 17.53 KG/M2 | HEART RATE: 82 BPM | DIASTOLIC BLOOD PRESSURE: 68 MMHG | RESPIRATION RATE: 16 BRPM

## 2024-09-16 DIAGNOSIS — R42 DIZZINESS: Primary | ICD-10-CM

## 2024-09-16 DIAGNOSIS — R11.2 NAUSEA AND VOMITING, UNSPECIFIED VOMITING TYPE: ICD-10-CM

## 2024-09-16 PROBLEM — I95.1 ORTHOSTASIS: Status: ACTIVE | Noted: 2024-09-16

## 2024-09-16 PROBLEM — R29.90 STROKE-LIKE SYMPTOMS: Status: ACTIVE | Noted: 2024-09-16

## 2024-09-16 PROBLEM — E86.0 DEHYDRATION: Status: ACTIVE | Noted: 2024-09-16

## 2024-09-16 LAB
ALBUMIN SERPL-MCNC: 3.1 G/DL (ref 3.4–5)
ALBUMIN/GLOB SERPL: 1.7 {RATIO} (ref 1.1–2.2)
ALP SERPL-CCNC: 55 U/L (ref 40–129)
ALT SERPL-CCNC: 16 U/L (ref 10–40)
ANION GAP SERPL CALCULATED.3IONS-SCNC: 10 MMOL/L (ref 3–16)
AST SERPL-CCNC: 24 U/L (ref 15–37)
BASE EXCESS BLDV CALC-SCNC: 2.3 MMOL/L (ref -2–3)
BASOPHILS # BLD: 0 K/UL (ref 0–0.2)
BASOPHILS NFR BLD: 0.1 %
BILIRUB SERPL-MCNC: <0.2 MG/DL (ref 0–1)
BILIRUB UR QL STRIP.AUTO: NEGATIVE
BUN SERPL-MCNC: 30 MG/DL (ref 7–20)
CALCIUM SERPL-MCNC: 8.2 MG/DL (ref 8.3–10.6)
CHLORIDE SERPL-SCNC: 103 MMOL/L (ref 99–110)
CLARITY UR: CLEAR
CO2 BLDV-SCNC: 32 MMOL/L
CO2 SERPL-SCNC: 24 MMOL/L (ref 21–32)
COHGB MFR BLDV: 1.1 % (ref 0–1.5)
COLOR UR: YELLOW
CREAT SERPL-MCNC: 0.6 MG/DL (ref 0.6–1.2)
DEPRECATED RDW RBC AUTO: 11.8 % (ref 12.4–15.4)
DO-HGB MFR BLDV: 34.8 %
EKG ATRIAL RATE: 81 BPM
EKG ATRIAL RATE: 91 BPM
EKG DIAGNOSIS: NORMAL
EKG DIAGNOSIS: NORMAL
EKG P AXIS: 59 DEGREES
EKG P AXIS: 79 DEGREES
EKG P-R INTERVAL: 132 MS
EKG P-R INTERVAL: 168 MS
EKG Q-T INTERVAL: 366 MS
EKG Q-T INTERVAL: 376 MS
EKG QRS DURATION: 74 MS
EKG QRS DURATION: 78 MS
EKG QTC CALCULATION (BAZETT): 436 MS
EKG QTC CALCULATION (BAZETT): 450 MS
EKG R AXIS: -21 DEGREES
EKG R AXIS: 2 DEGREES
EKG T AXIS: 28 DEGREES
EKG T AXIS: 48 DEGREES
EKG VENTRICULAR RATE: 81 BPM
EKG VENTRICULAR RATE: 91 BPM
EOSINOPHIL # BLD: 0 K/UL (ref 0–0.6)
EOSINOPHIL NFR BLD: 0.3 %
FLUAV RNA RESP QL NAA+PROBE: NOT DETECTED
FLUBV RNA RESP QL NAA+PROBE: NOT DETECTED
GFR SERPLBLD CREATININE-BSD FMLA CKD-EPI: 82 ML/MIN/{1.73_M2}
GLUCOSE BLD-MCNC: 140 MG/DL (ref 70–99)
GLUCOSE SERPL-MCNC: 141 MG/DL (ref 70–99)
GLUCOSE UR STRIP.AUTO-MCNC: NEGATIVE MG/DL
HCO3 BLDV-SCNC: 30.1 MMOL/L (ref 24–28)
HCT VFR BLD AUTO: 31.4 % (ref 36–48)
HGB BLD-MCNC: 10.5 G/DL (ref 12–16)
HGB UR QL STRIP.AUTO: NEGATIVE
KETONES UR STRIP.AUTO-MCNC: NEGATIVE MG/DL
LEUKOCYTE ESTERASE UR QL STRIP.AUTO: NEGATIVE
LYMPHOCYTES # BLD: 0.3 K/UL (ref 1–5.1)
LYMPHOCYTES NFR BLD: 4.1 %
MCH RBC QN AUTO: 35.8 PG (ref 26–34)
MCHC RBC AUTO-ENTMCNC: 33.5 G/DL (ref 31–36)
MCV RBC AUTO: 106.8 FL (ref 80–100)
METHGB MFR BLDV: 0.4 % (ref 0–1.5)
MONOCYTES # BLD: 0.5 K/UL (ref 0–1.3)
MONOCYTES NFR BLD: 6 %
NEUTROPHILS # BLD: 7.5 K/UL (ref 1.7–7.7)
NEUTROPHILS NFR BLD: 89.5 %
NITRITE UR QL STRIP.AUTO: NEGATIVE
NT-PROBNP SERPL-MCNC: 839 PG/ML (ref 0–449)
PCO2 BLDV: 62.8 MMHG (ref 41–51)
PERFORMED ON: ABNORMAL
PH BLDV: 7.29 [PH] (ref 7.35–7.45)
PH UR STRIP.AUTO: 7 [PH] (ref 5–8)
PLATELET # BLD AUTO: 211 K/UL (ref 135–450)
PMV BLD AUTO: 8 FL (ref 5–10.5)
PO2 BLDV: 37.7 MMHG (ref 25–40)
POTASSIUM SERPL-SCNC: 4.8 MMOL/L (ref 3.5–5.1)
PROT SERPL-MCNC: 4.9 G/DL (ref 6.4–8.2)
PROT UR STRIP.AUTO-MCNC: NEGATIVE MG/DL
RBC # BLD AUTO: 2.94 M/UL (ref 4–5.2)
SAO2 % BLDV: 65 %
SARS-COV-2 RNA RESP QL NAA+PROBE: NOT DETECTED
SODIUM SERPL-SCNC: 137 MMOL/L (ref 136–145)
SP GR UR STRIP.AUTO: 1.01 (ref 1–1.03)
TROPONIN, HIGH SENSITIVITY: 19 NG/L (ref 0–14)
TROPONIN, HIGH SENSITIVITY: 24 NG/L (ref 0–14)
TROPONIN, HIGH SENSITIVITY: 38 NG/L (ref 0–14)
UA COMPLETE W REFLEX CULTURE PNL UR: NORMAL
UA DIPSTICK W REFLEX MICRO PNL UR: NORMAL
URN SPEC COLLECT METH UR: NORMAL
UROBILINOGEN UR STRIP-ACNC: 0.2 E.U./DL
WBC # BLD AUTO: 8.4 K/UL (ref 4–11)

## 2024-09-16 PROCEDURE — 99285 EMERGENCY DEPT VISIT HI MDM: CPT

## 2024-09-16 PROCEDURE — 80053 COMPREHEN METABOLIC PANEL: CPT

## 2024-09-16 PROCEDURE — 83880 ASSAY OF NATRIURETIC PEPTIDE: CPT

## 2024-09-16 PROCEDURE — 70498 CT ANGIOGRAPHY NECK: CPT

## 2024-09-16 PROCEDURE — 1200000000 HC SEMI PRIVATE

## 2024-09-16 PROCEDURE — G0378 HOSPITAL OBSERVATION PER HR: HCPCS

## 2024-09-16 PROCEDURE — 85025 COMPLETE CBC W/AUTO DIFF WBC: CPT

## 2024-09-16 PROCEDURE — 84484 ASSAY OF TROPONIN QUANT: CPT

## 2024-09-16 PROCEDURE — 97166 OT EVAL MOD COMPLEX 45 MIN: CPT

## 2024-09-16 PROCEDURE — 6360000004 HC RX CONTRAST MEDICATION: Performed by: EMERGENCY MEDICINE

## 2024-09-16 PROCEDURE — 82803 BLOOD GASES ANY COMBINATION: CPT

## 2024-09-16 PROCEDURE — 81003 URINALYSIS AUTO W/O SCOPE: CPT

## 2024-09-16 PROCEDURE — 93010 ELECTROCARDIOGRAM REPORT: CPT | Performed by: INTERNAL MEDICINE

## 2024-09-16 PROCEDURE — 6360000002 HC RX W HCPCS: Performed by: STUDENT IN AN ORGANIZED HEALTH CARE EDUCATION/TRAINING PROGRAM

## 2024-09-16 PROCEDURE — 96365 THER/PROPH/DIAG IV INF INIT: CPT

## 2024-09-16 PROCEDURE — 93005 ELECTROCARDIOGRAM TRACING: CPT | Performed by: STUDENT IN AN ORGANIZED HEALTH CARE EDUCATION/TRAINING PROGRAM

## 2024-09-16 PROCEDURE — 2500000003 HC RX 250 WO HCPCS: Performed by: INTERNAL MEDICINE

## 2024-09-16 PROCEDURE — 93005 ELECTROCARDIOGRAM TRACING: CPT | Performed by: EMERGENCY MEDICINE

## 2024-09-16 PROCEDURE — 96361 HYDRATE IV INFUSION ADD-ON: CPT

## 2024-09-16 PROCEDURE — 71046 X-RAY EXAM CHEST 2 VIEWS: CPT

## 2024-09-16 PROCEDURE — 96372 THER/PROPH/DIAG INJ SC/IM: CPT

## 2024-09-16 PROCEDURE — 97161 PT EVAL LOW COMPLEX 20 MIN: CPT

## 2024-09-16 PROCEDURE — 70450 CT HEAD/BRAIN W/O DYE: CPT

## 2024-09-16 PROCEDURE — 6370000000 HC RX 637 (ALT 250 FOR IP): Performed by: STUDENT IN AN ORGANIZED HEALTH CARE EDUCATION/TRAINING PROGRAM

## 2024-09-16 PROCEDURE — 96375 TX/PRO/DX INJ NEW DRUG ADDON: CPT

## 2024-09-16 PROCEDURE — 87636 SARSCOV2 & INF A&B AMP PRB: CPT

## 2024-09-16 PROCEDURE — 6360000002 HC RX W HCPCS: Performed by: EMERGENCY MEDICINE

## 2024-09-16 PROCEDURE — 2580000003 HC RX 258: Performed by: EMERGENCY MEDICINE

## 2024-09-16 PROCEDURE — 2580000003 HC RX 258: Performed by: INTERNAL MEDICINE

## 2024-09-16 PROCEDURE — 96366 THER/PROPH/DIAG IV INF ADDON: CPT

## 2024-09-16 PROCEDURE — 97116 GAIT TRAINING THERAPY: CPT

## 2024-09-16 PROCEDURE — 36415 COLL VENOUS BLD VENIPUNCTURE: CPT

## 2024-09-16 PROCEDURE — 97535 SELF CARE MNGMENT TRAINING: CPT

## 2024-09-16 RX ORDER — ATORVASTATIN CALCIUM 80 MG/1
80 TABLET, FILM COATED ORAL NIGHTLY
Status: DISCONTINUED | OUTPATIENT
Start: 2024-09-16 | End: 2024-09-16 | Stop reason: HOSPADM

## 2024-09-16 RX ORDER — ONDANSETRON 2 MG/ML
4 INJECTION INTRAMUSCULAR; INTRAVENOUS ONCE
Status: COMPLETED | OUTPATIENT
Start: 2024-09-16 | End: 2024-09-16

## 2024-09-16 RX ORDER — SODIUM CHLORIDE, SODIUM LACTATE, POTASSIUM CHLORIDE, AND CALCIUM CHLORIDE .6; .31; .03; .02 G/100ML; G/100ML; G/100ML; G/100ML
1000 INJECTION, SOLUTION INTRAVENOUS ONCE
Status: COMPLETED | OUTPATIENT
Start: 2024-09-16 | End: 2024-09-16

## 2024-09-16 RX ORDER — ONDANSETRON 2 MG/ML
4 INJECTION INTRAMUSCULAR; INTRAVENOUS EVERY 6 HOURS PRN
Status: DISCONTINUED | OUTPATIENT
Start: 2024-09-16 | End: 2024-09-16 | Stop reason: HOSPADM

## 2024-09-16 RX ORDER — POLYETHYLENE GLYCOL 3350 17 G/17G
17 POWDER, FOR SOLUTION ORAL DAILY
Status: ON HOLD | COMMUNITY
End: 2024-09-16 | Stop reason: HOSPADM

## 2024-09-16 RX ORDER — ACETAMINOPHEN 500 MG
1000 TABLET ORAL EVERY 8 HOURS PRN
Status: DISCONTINUED | OUTPATIENT
Start: 2024-09-16 | End: 2024-09-16 | Stop reason: HOSPADM

## 2024-09-16 RX ORDER — ENOXAPARIN SODIUM 100 MG/ML
30 INJECTION SUBCUTANEOUS DAILY
Status: DISCONTINUED | OUTPATIENT
Start: 2024-09-16 | End: 2024-09-16 | Stop reason: HOSPADM

## 2024-09-16 RX ORDER — ONDANSETRON 4 MG/1
4 TABLET, ORALLY DISINTEGRATING ORAL EVERY 8 HOURS PRN
Status: DISCONTINUED | OUTPATIENT
Start: 2024-09-16 | End: 2024-09-16 | Stop reason: HOSPADM

## 2024-09-16 RX ORDER — LABETALOL HYDROCHLORIDE 5 MG/ML
10 INJECTION, SOLUTION INTRAVENOUS EVERY 10 MIN PRN
Status: DISCONTINUED | OUTPATIENT
Start: 2024-09-16 | End: 2024-09-16 | Stop reason: HOSPADM

## 2024-09-16 RX ORDER — IOPAMIDOL 755 MG/ML
75 INJECTION, SOLUTION INTRAVASCULAR
Status: COMPLETED | OUTPATIENT
Start: 2024-09-16 | End: 2024-09-16

## 2024-09-16 RX ORDER — ASPIRIN 300 MG/1
300 SUPPOSITORY RECTAL DAILY
Status: DISCONTINUED | OUTPATIENT
Start: 2024-09-16 | End: 2024-09-16

## 2024-09-16 RX ORDER — ASPIRIN 81 MG/1
81 TABLET, CHEWABLE ORAL DAILY
Status: DISCONTINUED | OUTPATIENT
Start: 2024-09-16 | End: 2024-09-16 | Stop reason: HOSPADM

## 2024-09-16 RX ORDER — SENNOSIDES 8.6 MG
1300 CAPSULE ORAL 3 TIMES DAILY
Status: ON HOLD | COMMUNITY
End: 2024-09-16 | Stop reason: HOSPADM

## 2024-09-16 RX ADMIN — IOPAMIDOL 75 ML: 755 INJECTION, SOLUTION INTRAVENOUS at 03:10

## 2024-09-16 RX ADMIN — ASPIRIN 81 MG: 81 TABLET, CHEWABLE ORAL at 09:09

## 2024-09-16 RX ADMIN — ONDANSETRON 4 MG: 2 INJECTION INTRAMUSCULAR; INTRAVENOUS at 03:56

## 2024-09-16 RX ADMIN — ASCORBIC ACID, VITAMIN A PALMITATE, CHOLECALCIFEROL, THIAMINE HYDROCHLORIDE, RIBOFLAVIN-5 PHOSPHATE SODIUM, PYRIDOXINE HYDROCHLORIDE, NIACINAMIDE, DEXPANTHENOL, ALPHA-TOCOPHEROL ACETATE, VITAMIN K1, FOLIC ACID, BIOTIN, CYANOCOBALAMIN: 200; 3300; 200; 6; 3.6; 6; 40; 15; 10; 150; 600; 60; 5 INJECTION, SOLUTION INTRAVENOUS at 14:26

## 2024-09-16 RX ADMIN — ENOXAPARIN SODIUM 30 MG: 100 INJECTION SUBCUTANEOUS at 09:09

## 2024-09-16 RX ADMIN — SODIUM CHLORIDE, POTASSIUM CHLORIDE, SODIUM LACTATE AND CALCIUM CHLORIDE 1000 ML: 600; 310; 30; 20 INJECTION, SOLUTION INTRAVENOUS at 03:20

## 2024-09-16 ASSESSMENT — PAIN - FUNCTIONAL ASSESSMENT: PAIN_FUNCTIONAL_ASSESSMENT: NONE - DENIES PAIN

## 2024-09-16 ASSESSMENT — LIFESTYLE VARIABLES
HOW OFTEN DO YOU HAVE A DRINK CONTAINING ALCOHOL: MONTHLY OR LESS
HOW MANY STANDARD DRINKS CONTAINING ALCOHOL DO YOU HAVE ON A TYPICAL DAY: 1 OR 2

## 2024-09-16 NOTE — CARE COORDINATION
Case Management Assessment            Discharge Note                    Date / Time of Note: 9/16/2024 2:04 PM                  Discharge Note Completed by: Ida Luna RN    Patient Name: Syeda Villarreal   YOB: 1927  Diagnosis: Dizziness [R42]  Stroke-like symptoms [R29.90]  Nausea and vomiting, unspecified vomiting type [R11.2]  Orthostasis [I95.1]   Date / Time: 9/16/2024  2:06 AM    Current PCP: Gibson Phan MD  Clinic patient: No    Hospitalization in the last 30 days: No       Advance Directives:  Code Status: DNR-CCA  [place] DNR form completed and on chart: Yes    Financial:  Payor: MEDICARE / Plan: MEDICARE PART A AND B / Product Type: *No Product type* /      Pharmacy:    [id number] [place], [place] - [address] [facility] - P [phone] - F [phone]  [address] [place] [id number]  Phone: [phone] Fax: [phone]      Assistance purchasing medications?:    Assistance provided by Case Management: None at this time    Does patient want to participate in local refill/ meds to beds program?:      Meds To Beds General Rules:  1. Can ONLY be done Monday- Friday between 8:30am-5pm  2. Prescription(s) must be in pharmacy by 3pm to be filled same day  3.Copy of patient's insurance/ prescription drug card and patient face sheet must be sent along with the prescription(s)  4. Cost of Rx cannot be added to hospital bill. If financial assistance is needed, please contact unit  or ;  or  CANNOT provide pharmacy voucher for patients co-pays  5. Patients can then  the prescription on their way out of the hospital at discharge, or pharmacy can deliver to the bedside if staff is available. (payment due at time of pick-up or delivery - cash, check, or card accepted)     Able to afford home medications/ co-pay costs: Yes    ADLS:  Current PT AM-PAC Score: 18 /24  Current OT AM-PAC Score:    /24    DISCHARGE Disposition: Home- No Services Needed    LOC at discharge: Not Applicable  JOHN Completed: Not Indicated    Notification completed in HENS/PAS?:  Not Applicable    IMM Completed:   Yes, Case management has presented and reviewed IMM letter #2 to the patient and/or family/ POA. Patient and/or family/POA verbalized understanding of their medicare rights and appeal process if needed. Patient and/or family/POA verbalized understanding of DC within 4 hours of signing IMM letter #2. Patient and/or family/POA has signed and placed today's date (9/16/24) and time (1356) on IMM letter #2 on the the appropriate lines. Patient and/or family/POA, provided copy of letter and they are aware that original copy of IMM letter #2 is available prior to discharge from the paper chart on the unit.  Electronic documentation has been entered into epic for IMM letter #2 and original paper copy has been added to the paper chart at the nurses station.         Transportation:  Transportation PLAN for discharge: family   Mode of Transport: Private Car  Reason for medical transport: Not Applicable  Name of Transport Company: Not Applicable  Time of Transport: tbd    Transport form completed: Not Indicated    Home Care:  Home Care ordered at discharge: Not Indicated  Home Care Agency: Not Applicable  Orders faxed: No    Durable Medical Equipment:  DME Provider: n/a  Equipment obtained during hospitalization: n/a      Additional CM Notes: Patient is discharging home, has family and friend support.  No CM needs at this time.  Daughter to transport home.    COVID Result:    Lab Results   Component Value Date/Time    COVID19 NOT DETECTED 09/16/2024 03:53 AM       The Plan for Transition of Care is related to the following treatment goals of Dizziness [R42]  Stroke-like symptoms [R29.90]  Nausea and vomiting, unspecified vomiting type [R11.2]  Orthostasis [I95.1]    The Patient and/or patient representative Syeda and her family were

## 2024-09-16 NOTE — H&P
History and Physical      Name:  Syeda Villarreal /Age/Sex: 1927  (89 y.o. female)   MRN & CSN:  [id number] & [id number] Encounter Date/Time: 2024 5:51 AM EDT   Location:  [facility]5509-01 PCP: Gibson Phan MD       Assessment and Plan:   Syeda Villarreal is a 89 y.o. female with hypertension chronic macrocytic anemia presented from home due to persistent dizziness    Dizziness rule out posterior circulation CVA  LKN around afternoon 9/15/2024 NIH 0 on arrival CT brain and CTA head and neck reviewed no acute hemorrhage or significant LVO.  Evaluated by stroke team not a candidate for TNK due to low NIH  Aspirin and Lipitor  NIHSS and neurochecks per stroke protocol  MRI brain in a.m. PT OT evaluation    Hypertension  Hold home medications due to permissive hypertension    Elevated troponin probably related to hypertensive urgency  Troponin 19> 24 EKG no STEMI or equivalent pattern  Repeat troponin    Chronic microcytic anemia  Monitor CBC    Observation MedSurg telemetry  DNR CCA    Disposition:     Current Living situation: Home  Expected Disposition: Home  Estimated D/C: 2 days    Diet ADULT DIET; Regular; Low Sodium (2 gm)   DVT Prophylaxis [x] Lovenox, []  Heparin, [] SCDs, [] Ambulation,  [] Eliquis, [] Xarelto, [] Coumadin   Code Status DNR-CCA   Surrogate Decision Maker/ HELEN Quan     Personally reviewed Lab Studies and Imaging   Discussed management of the case with ER provider who recommended admission for CVA workup    History from:     patient    History of Present Illness:     Chief Complaint   Patient presents with    Dizziness    Emesis     Patient presents to ED via [facility] from home with report of dizziness and vomiting. Patient and EMS report the patient had gotten up form bed and began feel dizzy. EMS reports patient was found down, patient denies falling. Patient reports feeling dizzy, states she has vomited numerous times. EMS reports patient is on blood thinners.

## 2024-09-16 NOTE — ED NOTES
How does patient ambulate?   []Low Fall Risk (ambulates by themselves without support)  []Stand by assist   []Contact Guard   [x]Front wheel walker  []Wheelchair   []Steady  []Bed bound  []History of Lower Extremity Amputation  []Unknown, did not assess in the emergency department   How does patient take pills?  [x]Whole with Water  []Crushed in applesauce  []Crushed in pudding  []Other  []Unknown no oral medications were given in the ED  Is patient alert?   [x]Alert  []Drowsy but responds to voice  []Doesn't respond to voice but responds to painful stimuli  []Unresponsive  Is patient oriented?   [x]To person  [x]To place  [x]To time  [x]To situation  []Confused  []Agitated  [x]Follows commands  If patient is disoriented or from a Skill Nursing Facility has family been notified of admission?   []Yes   [x]No  Patient belongings?   []Cell phone  []Wallet   []Dentures  [x]Clothing  Any specific patient or family belongings/needs/dynamics?   none  Miscellaneous comments/pending orders?  Patient is hard of hearing. She is continent and used bedside commode in ED     If there are any additional questions please reach out to the Emergency Department at [phone]          Serena Shay RN  09/16/24 0422

## 2024-09-16 NOTE — PROGRESS NOTES
Occupational Therapy  Facility/Department: [facility] 5T ORTHO/NEURO  Occupational Therapy Initial Assessment and Treatment    Name: Syeda Villarreal  : 1927  MRN: [id number]  Date of Service: 2024    Discharge Recommendations:  24 hour supervision or assist  OT Equipment Recommendations  Equipment Needed: No  Other: pt has the recommended DME       Patient Diagnosis(es): The primary encounter diagnosis was Dizziness. A diagnosis of Nausea and vomiting, unspecified vomiting type was also pertinent to this visit.  Past Medical History:  has a past medical history of Abdominal bruit, Carotid stenosis, Celiac disease, Degenerative arthritis of right shoulder region, Diverticulosis of colon, Hyperlipidemia, Iron defic anemia, Osteoarthritis of left hip, Osteoarthritis of left knee, Osteopenia, Osteoporosis, Pelvic pain, Sciatica of left side, Vitamin D deficiency, and Weight loss.  Past Surgical History:  has a past surgical history that includes Tonsillectomy; Hysterectomy; bladder repair; Bartholin gland cyst excision; and Cataract removal with implant (10/25/12).    Treatment Diagnosis: Impaired ADLs, mobility      Assessment  Performance deficits / Impairments: Decreased functional mobility ;Decreased ADL status;Decreased balance  Assessment: Pt from home alone with assist for IADLs and showers, uses RW prn. Pt demo slight functional decline this date, demo ADLs and mobility at RW with SBA. Pt denied dizziness with activity. Anticipate pt will progress well with increased activity this admission. Will follow for acute OT. Recommend initial 24hr assist at home  Treatment Diagnosis: Impaired ADLs, mobility  Prognosis: Good  Decision Making: Medium Complexity  REQUIRES OT FOLLOW-UP: Yes  Activity Tolerance  Activity Tolerance: Patient Tolerated treatment well     Plan  Occupational Therapy Plan  Times Per Week: 2-5    Restrictions  Position Activity Restriction  Other position/activity restrictions: up as  tolerated    Subjective  General  Chart Reviewed: Yes  Additional Pertinent Hx: Adm 9/16 with dizziness and vomiting. CT brain and CTA head and neck reviewed no acute hemorrhage or significant LVO.  Family / Caregiver Present: No  Diagnosis: stroke-like symptoms  Subjective  Subjective: Pt in bed upon OT entry. \"I feel better today\"  Pain: none    Social/Functional History  Social/Functional History  Lives With: Alone  Type of Home: House  Home Layout: One level, Laundry in basement (pt doesn't go to basement)  Home Access: Stairs to enter without rails  Entrance Stairs - Number of Steps: 1  Bathroom Shower/Tub: Tub/Shower unit  Bathroom Toilet: Standard (w/ toilet raiser; vanity close for leverage)  Bathroom Equipment: Grab bars in shower, Shower chair  Home Equipment: Walker - 4-Wheeled, Walker - Rolling, Reacher, Alert button  Has the patient had two or more falls in the past year or any fall with injury in the past year?:  (1 fall 3 months ago- was getting the paper and lost balance when turning.  \"My balance isn't very good\")  Receives Help From: Home health (3hrs 2x/week)  ADL Assistance: Needs assistance (aide assist for showers; otherwise independent)  Homemaking Assistance: Needs assistance (pt does simple meal prep; aide does meal prep; has cleaning lady 1x/month and aide assists; daughter grocery shops and does laundry)  Ambulation Assistance: Independent (some furniture walking during day, uses RW at night)  Transfer Assistance: Independent  Active : No  Patient's  Info: daughter transports  Occupation: Retired  Leisure & Hobbies: read the paper, crosswords    Objective  Observation/Palpation  Observation: marked kyphosis      Toilet Transfers  Equipment Used: Standard toilet  Toilet Transfer: Stand by assistance    UE Function  AROM:  (decreased ROM L shoulder but WFL)  Strength: Within functional limits  Coordination: Within functional limits    ADL  Feeding: Independent  Grooming: Stand by

## 2024-09-16 NOTE — ED PROVIDER NOTES
THE [facility]  EMERGENCY DEPARTMENT ENCOUNTER          ATTENDING PHYSICIAN NOTE       Date of evaluation: 9/16/2024    Chief Complaint     Dizziness and Emesis (Patient presents to ED via [facility] from home with report of dizziness and vomiting. Patient and EMS report the patient had gotten up form bed and began feel dizzy. EMS reports patient was found down, patient denies falling. Patient reports feeling dizzy, states she has vomited numerous times. EMS reports patient is on blood thinners. Patient denies pain.)      History of Present Illness     Syeda Villarreal is a 89 y.o. female who presents with complaint of nausea and a feeling of unsteadiness this morning.  She reports that she woke up in bed, heard a loud noise like a \"compressor\" that she thought was somebody working on the street, and said that she tried to get up but felt that she would be unsteady when she tried to get down the length of her bed to her walker.  She denies a room spinning sensation, says that she feels like she would be unsteady in her body.  Reports that she developed nausea and began vomiting after that.  She denies any abdominal pain.  Denies chest pain.  Denies shortness of breath.  Denies headache.  Denies weakness in arms or legs.    ASSESSMENT / PLAN  (MEDICAL DECISION MAKING)     INITIAL VITALS: BP: (!) 202/91, Temp: 97.8 °F (36.6 °C), Pulse: 89, Respirations: 18, SpO2: 98 %      Syeda Villarreal is a 89 y.o. female who presents with a vague sense of unsteadiness and her body moving in an uncontrolled way through space, denies johann vertigo.  Denies ever having the symptoms before, they are relatively sudden onset on waking today, do not appear strictly positional, and are associated with significant nausea and vomiting.  Given the patient's inability to walk at her baseline with some associated dizziness that cannot be readily explained otherwise, we initiated stroke workup, made patient a stroke alert as

## 2024-09-16 NOTE — PROGRESS NOTES
Pt a/o x4. VSS. Denies N/V. Neuro checks WDL. NIH 0. No complaints of pain at this time. Pt ambulating x1 assist w/ walker. Will continue to monitor patient.     Nichole Galo RN

## 2024-09-16 NOTE — PROGRESS NOTES
4 Eyes Skin Assessment     NAME:  Syeda Villarreal  YOB: 1927  MEDICAL RECORD NUMBER:  [id number]    The patient is being assessed for  Admission    I agree that at least one RN has performed a thorough Head to Toe Skin Assessment on the patient. ALL assessment sites listed below have been assessed.      Areas assessed by both nurses:    Head, Face, Ears, Shoulders, Back, Chest, Arms, Elbows, Hands, Sacrum. Buttock, Coccyx, Ischium, and Legs. Feet and Heels Redness to all bony prominence such as coccyx; bilateral heels and elbows; hips; shoulder blades; and in between toes         Does the Patient have a Wound? No noted wound(s)       Joni Prevention initiated by RN: No  Wound Care Orders initiated by RN: No    Pressure Injury (Stage 3,4, Unstageable, DTI, NWPT, and Complex wounds) if present, place Wound referral order by RN under : No    New Ostomies, if present place, Ostomy referral order under : No     Nurse 1 eSignature: Electronically signed by Bessie Bustos RN on 9/16/24 at 7:01 AM EDT    **SHARE this note so that the co-signing nurse can place an eSignature**    Nurse 2 eSignature: Electronically signed by Liliam Lemon RN on 9/16/24 at 7:15 AM EDT

## 2024-09-16 NOTE — PLAN OF CARE
Problem: Discharge Planning  Goal: Discharge to home or other facility with appropriate resources  9/16/2024 1223 by Nichole Galo, RN  Outcome: Progressing  9/16/2024 1223 by Nichole Galo RN  Outcome: Progressing     Problem: Safety - Adult  Goal: Free from fall injury  9/16/2024 1223 by Nichole Galo RN  Outcome: Progressing  Note: Patient free from falls/ injury during duration of shift. Bed alarm on, call light w/in reach, bed in lowest position, non-skid socks on and fall sign posted outside door.   9/16/2024 1223 by Nichole Galo, RN  Outcome: Progressing  Note: Patient free from falls/ injury during duration of shift. Bed alarm on, call light w/in reach, bed in lowest position, non-skid socks on and fall sign posted outside door.

## 2024-09-16 NOTE — PROGRESS NOTES
Current NIHSS 0    Nursing Core Measures for Stroke:   [x]   Education template documentation (STROKE/TIA). Please select only risk factors that are applicable to patient when selecting risk factors.  [x]   Care Plan template documentation (Physiologic Instability - Neurosensory). Selecting this will add care plan rows to the flowsheet under the Neuro section of Head to Toe.  [x]   Verified Swallow Screen completed prior to PO intake of food, drink, medications>          Please verify correct medication route prior to administration for intubated patients, patients who can not swallow or have alternative routes of intake (NG, OG, AL), etc  [x]   VTE Prophylaxis: SCDs ordered/addressed; SCDs: Off           (As a reminder, ASA, Plavix, and TPA/TNK are not VTE prophylaxis.)    Reviewed the Following Education with Patient and/or Family:   - Personalized risk factors for patient, along with changes, modifications that will help prevent stroke.  - Signs and Symptoms of Stroke: (Facial droop, weakness/numbness especially on one side, speech difficulty, sudden confusion, sudden loss of vision, sudden severe headache, sudden loss of balance or having difficulty walking, syncope, or seizure)  - How to activate EMS (911)   - Importance of Follow Up Appointments at Discharge   - Importance of Compliance with Medications Prescribed at Discharge  - Available community resources and stroke advocacy groups if needed    Patient and/or family member: verbalized understanding.     Stroke Education booklet given to patient/family (or verified, if given already), which reviews above information. yes         Electronically signed by ALVARADO CARL RN on 9/16/2024 at 12:25 PM

## 2024-09-16 NOTE — DISCHARGE SUMMARY
Hospital Medicine Discharge Summary    Patient: Syeda Villarreal     Gender: female  : 1927   Age: 89 y.o.  MRN: [id number]    Admitting Physician: Mario Thayer MD  Discharge Physician: Mario Thayer MD     Code Status: DNR-CCA     Admit Date: 2024   Discharge Date:   2024    Disposition:  Home    Discharge Diagnoses:    Active Hospital Problems    Diagnosis Date Noted    Stroke-like symptoms [R29.90] 2024    Orthostasis [I95.1] 2024       Follow-up appointments:  one week      Condition at Discharge:  Stable    Hospital Course:   89 year old female admitted with pre-syncopal episode and dizziness.  The patient states she was feeling dizzy and had not eaten.  The patient states that she felt better after getting IV fluids.  On admission the patient had elevation in BUN.  Today on exam the patient feels excellent.  Discussed utility of MRI with patient, risks benefits alternatives with son and ddaughter at bedside.  The patietn would like to not be startedon asa or statin.  I do not disagree with her.  She is 89 and is liliana.  She has no drift on exam, she had no stroke like symptoms on admission.  She had not been drinking enough liquids.  She is in agreement with getting fluid.  If mri shows old stroke she will likely need to be on asa and statin.  She has a normal EKG.  She looks liliana, I will discharge her today but have asked to monitor for recurrence of symptoms or new symptoms including but not limited to chest pain shortness of breath nausea vomiting fevers or chills and seek immediate medical attention or call 911.     Discharge Medications:   Current Discharge Medication List        Current Discharge Medication List        Current Discharge Medication List        CONTINUE these medications which have NOT CHANGED    Details   Calcium Carbonate (CALTRATE 600 PO) Take 2 tablets by mouth daily.      ferrous sulfate 325 (65 FE) MG tablet Take 1 tablet by mouth daily

## 2024-09-16 NOTE — PLAN OF CARE
Problem: Safety - Adult  Goal: Free from fall injury  9/16/2024 1223 by Nichole Galo, RN  Outcome: Progressing  Note: Patient free from falls/ injury during duration of shift. Bed alarm on, call light w/in reach, bed in lowest position, non-skid socks on and fall sign posted outside door.      Problem: Neurosensory - Adult  Goal: Achieves stable or improved neurological status  Outcome: Adequate for Discharge  Note: Nih 0. Will continue to perform neuro checks.

## 2024-09-16 NOTE — ED NOTES
Patient states she feels better now and opted to hold for her zofran IV.     Serena Shay, [id number]  09/16/24 [id number]

## 2024-09-16 NOTE — PROGRESS NOTES
Patient is A&Ox4 VSS this shift. Patient has endorsed no pain this shift.  Patient is tolerating PO diet. Tolerating ambulation x 1 assist with GB and walker. Voiding adequately. Patient updated on plan of care. Fall and safety precautions in place, call light within reach.

## 2024-09-16 NOTE — PROGRESS NOTES
Restriction  Other position/activity restrictions: up as tolerated     Subjective  General  Additional Pertinent Hx: Adm 9/16 with dizziness and vomiting. CT brain and CTA head and neck reviewed no acute hemorrhage or significant LVO.  Referring Practitioner: Jeovany  Diagnosis: Dizziness/vomiting  Subjective  Subjective: Pt in bed upon PT entry, agreeable to working with PT.  No complaint of pain noted at this time.  No dizziness noted.         Social/Functional History  Social/Functional History  Lives With: Alone  Type of Home: House  Home Layout: One level, Laundry in basement (pt doesn't go to basement)  Home Access: Stairs to enter without rails  Entrance Stairs - Number of Steps: 1  Bathroom Shower/Tub: Tub/Shower unit  Bathroom Toilet: Standard (w/ toilet raiser; vanity close for leverage)  Bathroom Equipment: Grab bars in shower, Shower chair  Home Equipment: Walker - 4-Wheeled, Walker - Rolling, Reacher, Alert button  Has the patient had two or more falls in the past year or any fall with injury in the past year?:  (1 fall 3 months ago- was getting the paper and lost balance when turning.  \"My balance isn't very good\")  Receives Help From: Home health (3hrs 2x/week)  ADL Assistance: Needs assistance (aide assist for showers; otherwise independent)  Homemaking Assistance: Needs assistance (pt does simple meal prep; aide does meal prep; has cleaning lady 1x/month and aide assists; daughter grocery shops and does laundry)  Ambulation Assistance: Independent (some furniture walking during day, uses RW at night)  Transfer Assistance: Independent  Active : No  Patient's  Info: daughter transports  Occupation: Retired  Leisure & Hobbies: read the paper, crosswords  Vision/Hearing  Vision  Vision: Within Functional Limits  Vision Exceptions: Wears glasses for reading  Hearing  Hearing: Exceptions to WFL (hearing aids at home)  Hearing Exceptions: Hard of hearing/hearing concerns    Cognition